# Patient Record
(demographics unavailable — no encounter records)

---

## 2017-05-11 NOTE — RAD
LUMBAR SPINE THREE VIEWS

5/10/17

 

No fracture or dislocation was seen. Disc space narrowing is present at L4-L5, and to a lesser exten
t at L3-L4. Small anterior osteophytes are present. The SI joints are not widened. There may be some
 very minor sclerosis of these joints. 

 

IMPRESSION:  

Mild degenerative changes with some disc space narrowing at L3-L4 and L4-L5. 

 

POS: HOME